# Patient Record
Sex: FEMALE | URBAN - METROPOLITAN AREA
[De-identification: names, ages, dates, MRNs, and addresses within clinical notes are randomized per-mention and may not be internally consistent; named-entity substitution may affect disease eponyms.]

---

## 2018-02-02 ENCOUNTER — HISTORICAL (OUTPATIENT)
Dept: SURGERY | Facility: HOSPITAL | Age: 33
End: 2018-02-02

## 2018-02-02 LAB
ABS NEUT (OLG): 3.2 X10(3)/MCL (ref 1.5–6.9)
ALBUMIN SERPL-MCNC: 4 GM/DL (ref 3.4–5)
ALBUMIN/GLOB SERPL: 1.1 RATIO
ALP SERPL-CCNC: 42 UNIT/L (ref 30–113)
ALT SERPL-CCNC: 16 UNIT/L (ref 10–45)
APTT PPP: 24.3 SECOND(S) (ref 25–35)
AST SERPL-CCNC: 16 UNIT/L (ref 15–37)
B-HCG SERPL QL: NEGATIVE
BASOPHILS # BLD AUTO: 0 X10(3)/MCL (ref 0–0.1)
BASOPHILS NFR BLD AUTO: 0 % (ref 0–1)
BILIRUB SERPL-MCNC: 0.3 MG/DL (ref 0.1–0.9)
BILIRUBIN DIRECT+TOT PNL SERPL-MCNC: 0.1 MG/DL (ref 0–0.3)
BILIRUBIN DIRECT+TOT PNL SERPL-MCNC: 0.2 MG/DL
BUN SERPL-MCNC: 17 MG/DL (ref 10–20)
CALCIUM SERPL-MCNC: 9.4 MG/DL (ref 8–10.5)
CHLORIDE SERPL-SCNC: 103 MMOL/L (ref 100–108)
CO2 SERPL-SCNC: 28 MMOL/L (ref 21–35)
CREAT SERPL-MCNC: 0.53 MG/DL (ref 0.7–1.3)
EOSINOPHIL # BLD AUTO: 0.1 X10(3)/MCL (ref 0–0.6)
EOSINOPHIL NFR BLD AUTO: 2 % (ref 0–5)
ERYTHROCYTE [DISTWIDTH] IN BLOOD BY AUTOMATED COUNT: 11.9 % (ref 11.5–17)
GLOBULIN SER-MCNC: 3.5 GM/DL
GLUCOSE SERPL-MCNC: 72 MG/DL (ref 75–116)
HCT VFR BLD AUTO: 40.9 % (ref 36–48)
HGB BLD-MCNC: 13.4 GM/DL (ref 12–16)
INR PPP: 0.9 (ref 0–1.2)
LYMPHOCYTES # BLD AUTO: 2.7 X10(3)/MCL (ref 0.5–4.1)
LYMPHOCYTES NFR BLD AUTO: 40.5 % (ref 15–40)
MCH RBC QN AUTO: 33 PG (ref 27–34)
MCHC RBC AUTO-ENTMCNC: 33 GM/DL (ref 31–36)
MCV RBC AUTO: 100 FL (ref 80–99)
MONOCYTES # BLD AUTO: 0.6 X10(3)/MCL (ref 0–1.1)
MONOCYTES NFR BLD AUTO: 8 % (ref 4–12)
NEUTROPHILS # BLD AUTO: 3.2 X10(3)/MCL (ref 1.5–6.9)
NEUTROPHILS NFR BLD AUTO: 49 % (ref 43–75)
PLATELET # BLD AUTO: 179 X10(3)/MCL (ref 140–400)
PMV BLD AUTO: 11.2 FL (ref 6.8–10)
POTASSIUM SERPL-SCNC: 4 MMOL/L (ref 3.6–5.2)
PROT SERPL-MCNC: 7.5 GM/DL (ref 6.4–8.2)
PROTHROMBIN TIME: 10 SECOND(S) (ref 9–12)
RBC # BLD AUTO: 4.11 X10(6)/MCL (ref 4.2–5.4)
SODIUM SERPL-SCNC: 141 MMOL/L (ref 135–145)
WBC # SPEC AUTO: 6.6 X10(3)/MCL (ref 4.5–11.5)

## 2018-02-05 ENCOUNTER — HISTORICAL (OUTPATIENT)
Dept: ANESTHESIOLOGY | Facility: HOSPITAL | Age: 33
End: 2018-02-05

## 2022-04-30 NOTE — OP NOTE
ADMITTING DIAGNOSIS:  Possible regional enteritis of the small bowel with small bowel wall thickening in the right lower quadrant.    PROCEDURE:  Colonoscopy to the cecum with intubation of ileocecal valve and examination of terminal ileum.    POSTOPERATIVE DIAGNOSES:    1. Normal terminal ilium and appearance.  2. Normal colonoscopy with grade 2 hemorrhoids.    PLAN:    1. Discussion of colonoscopic findings.  2. Follow up with regard to the EGD findings as well.    3. Check for possible Lyme disease.    BRIEF HISTORY:  Patient is a 32-year-old  female with a history of anemia, smokes a pack a day for about 3-4 years and had a previous cholecystectomy in 2001.  She had midepigastric right upper quadrant pain with diarrhea and nausea, fatty food intolerance, chills and sweats at night.  The patient had a low-grade temperature of 100.8.  CT scan showed possibility of small intestinal enteritis.  CT was done in [place].  Patient also on the left arm had a tick bite that she obtained in [place] during July.  The patient has been having symptoms for about 6-8 months and has lost weight from 118 pounds down to 108 pounds over the last 6 months.  The patient had stool PCRs that were negative on 01/26/2018.  UA was also negative on 01/05/2018 and on 01/05/2018 a CBC showed a white count of with a hemoglobin of 16, hematocrit of 44.  The patient's CT on 01/05/2018 also showed regional enteritis of the small intestine with thickening.  The patient was consented for colonoscopy based on abnormal CT findings.       The patient underwent colonoscopy to the cecum and intubation of ileocecal valve.  The terminal ileum was examined up to 20 cm and found to be normal.  I had no cobblestoning, no ulcerations, no evidence of any abnormalities.  Cecum, ascending colon, transverse colon, descending colon, rectosigmoid were unremarkable.  Digital exam revealed good tone.  No masses.  Grade 2 internal hemorrhoids were  noted.  No other pathology was seen.       My intention will be to check an EGD on 02/07/2018 and if negative will proceed with capsule endoscopy as an alternative option to evaluate for possible regional enteritis.  Pending these results, my plan will be to check these results first and then proceed with further intervention pending these results.  I appreciate the consultation referral from her nurse practitioner, Mr. Tim Zimmerman.        SERENE/JASMYNE   DD: 02/05/2018 1301   DT: 02/05/2018 1328  Job # [id number]    cc: Tim Zimmerman NP